# Patient Record
Sex: FEMALE | Race: WHITE
[De-identification: names, ages, dates, MRNs, and addresses within clinical notes are randomized per-mention and may not be internally consistent; named-entity substitution may affect disease eponyms.]

---

## 2017-01-26 ENCOUNTER — HOSPITAL ENCOUNTER (OUTPATIENT)
Age: 57
Discharge: HOME | End: 2017-01-26
Payer: MEDICAID

## 2017-01-26 DIAGNOSIS — E03.9: Primary | ICD-10-CM

## 2017-06-12 NOTE — PREOP HISTORY & PHYSICAL
DATE OF ADMISSION/SURGERY: 06/14/2017

 

IDENTIFICATION: A 56-year-old G6, P3-0-3-3.

 

HISTORY OF PRESENT ILLNESS: The patient presents to [facility] for a scheduled preop
erative visit. She is anticipated to have a hysteroscopy, dilatation and curettage secondary to endom
etrial cells found on her Pap smear. Despite multiple attempts, we could not obtain a satisfactory en
dometrial sample. Only endocervical cells were obtained. I discussed with the patient the risks, bene
fits, alternatives, indications and expectations of a hysteroscopy, dilatation and curettage. Include
d in our discussion were the risk of hemorrhage, infection, and with this particular surgery inadvert
ent uterine perforation. After all of her questions were answered to her satisfaction, she verbalized
 her desire to proceed with surgery. Consent forms have been signed.

 

The patient is feeling well. She denies any nausea, vomiting, fevers, chills, diarrhea or constipatio
n.

 

PAST MEDICAL HISTORY

1. Morbid obesity.

2. Hypertension.

3. Hypothyroidism.

4. Alopecia areata.

 

PAST SURGICAL HISTORY: Exploratory laparotomy with excision of her right fallopian tube. 

 

ALLERGIES: MORPHINE, ITCHING.

 

MEDICATIONS: Tennyson Thyroid 90 mg 2 tablets p.o. daily via [facility]. 

 

SOCIAL HISTORY: She denies any tobacco, alcohol or illicit drug use.

 

PAST OBSTETRICAL HISTORY: Three term spontaneous vaginal deliveries and 3 miscarriages.

 

PAST GYNECOLOGICAL HISTORY: She has had a history of ASCUS Pap smears, but her most recent Pap smear,
 with the exception of endometrial cells seen on the specimen, was negative and negative for high-ris
k human papillomavirus as well. Her 07/05/2015 Pap smear and high-risk human papilloma virus is negat
lady.

 

REVIEW OF SYSTEMS: Negative unless otherwise stated.

 

OBJECTIVE

VITAL SIGNS: Weight is 309 pounds, blood pressure 122/80.

GENERAL: The patient is a well-developed, well-nourished  female who is alert and anthony
ented x3. She is a very pleasant and intelligent lady.

HEENT: Within normal limits. She does wear glasses.

CARDIOVASCULAR: Rate is regular, no murmurs or rubs.

PULMONARY: Lungs clear to auscultation bilaterally.

ABDOMEN: Obese but nontender. No masses, rigidity, or bounding.

 

ASSESSMENT

1. A 56-year-old G6, P3-0-3-3.

2. Endometrial cells seen on Pap smear with failed outpatient endometrial biopsy attempt.

 

PLAN

1. We will proceed to a scheduled hysteroscopy, dilatation and curettage on 06/14/2017.

2. Prescription for Ativan for anxiety prior to surgery as well as for Vicodin for postoperative pain
 control.

3. She will see me in 2 weeks at [facility] for a routine postop visit.

 

 

 

DD:06/12/2017 11:36:00  DT: 06/12/2017 12:20  JOB #: [id number]  EXT JOB #:[id number]

## 2017-06-14 ENCOUNTER — HOSPITAL ENCOUNTER (INPATIENT)
Dept: HOSPITAL 76 - SDS | Age: 57
LOS: 3 days | Discharge: HOME | DRG: 981 | End: 2017-06-17
Attending: OBSTETRICS & GYNECOLOGY | Admitting: OBSTETRICS & GYNECOLOGY
Payer: MEDICAID

## 2017-06-14 DIAGNOSIS — D25.0: ICD-10-CM

## 2017-06-14 DIAGNOSIS — K56.7: ICD-10-CM

## 2017-06-14 DIAGNOSIS — K66.1: ICD-10-CM

## 2017-06-14 DIAGNOSIS — I10: ICD-10-CM

## 2017-06-14 DIAGNOSIS — Z90.721: ICD-10-CM

## 2017-06-14 DIAGNOSIS — D25.2: ICD-10-CM

## 2017-06-14 DIAGNOSIS — G89.18: ICD-10-CM

## 2017-06-14 DIAGNOSIS — N99.71: Primary | ICD-10-CM

## 2017-06-14 DIAGNOSIS — E66.01: ICD-10-CM

## 2017-06-14 DIAGNOSIS — E05.90: ICD-10-CM

## 2017-06-14 DIAGNOSIS — Z91.14: ICD-10-CM

## 2017-06-14 DIAGNOSIS — E03.9: ICD-10-CM

## 2017-06-14 DIAGNOSIS — Z78.0: ICD-10-CM
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
## 2017-06-14 LAB
BASOPHILS NFR BLD AUTO: 0 10^3/UL (ref 0–0.1)
BASOPHILS NFR BLD AUTO: 0 10^3/UL (ref 0–0.1)
BASOPHILS NFR BLD AUTO: 0.2 %
BASOPHILS NFR BLD AUTO: 0.4 %
EOSINOPHIL # BLD AUTO: 0 10^3/UL (ref 0–0.7)
EOSINOPHIL # BLD AUTO: 0.2 10^3/UL (ref 0–0.7)
EOSINOPHIL NFR BLD AUTO: 0.1 %
EOSINOPHIL NFR BLD AUTO: 4.2 %
ERYTHROCYTE [DISTWIDTH] IN BLOOD BY AUTOMATED COUNT: 13.6 % (ref 12–15)
ERYTHROCYTE [DISTWIDTH] IN BLOOD BY AUTOMATED COUNT: 14 % (ref 12–15)
HCT VFR BLD AUTO: 28.3 % (ref 37–47)
HCT VFR BLD AUTO: 33.3 % (ref 37–47)
HGB UR QL STRIP: 11.2 G/DL (ref 12–16)
HGB UR QL STRIP: 9.6 G/DL (ref 12–16)
LYMPHOCYTES # SPEC AUTO: 0.7 10^3/UL (ref 1.5–3.5)
LYMPHOCYTES # SPEC AUTO: 1.6 10^3/UL (ref 1.5–3.5)
LYMPHOCYTES NFR BLD AUTO: 31.5 %
LYMPHOCYTES NFR BLD AUTO: 6.7 %
MCH RBC QN AUTO: 29 PG (ref 27–31)
MCH RBC QN AUTO: 29.2 PG (ref 27–31)
MCHC RBC AUTO-ENTMCNC: 33.5 G/DL (ref 32–36)
MCHC RBC AUTO-ENTMCNC: 33.8 G/DL (ref 32–36)
MCV RBC AUTO: 86.5 FL (ref 81–99)
MCV RBC AUTO: 86.7 FL (ref 81–99)
MONOCYTES # BLD AUTO: 0.2 10^3/UL (ref 0–1)
MONOCYTES # BLD AUTO: 0.4 10^3/UL (ref 0–1)
MONOCYTES NFR BLD AUTO: 4.4 %
MONOCYTES NFR BLD AUTO: 4.5 %
NEUTROPHILS # BLD AUTO: 3 10^3/UL (ref 1.5–6.6)
NEUTROPHILS # BLD AUTO: 8.8 10^3/UL (ref 1.5–6.6)
NEUTROPHILS # SNV AUTO: 10 X10^3/UL (ref 4.8–10.8)
NEUTROPHILS # SNV AUTO: 5 X10^3/UL (ref 4.8–10.8)
NEUTROPHILS NFR BLD AUTO: 59.5 %
NEUTROPHILS NFR BLD AUTO: 88.5 %
NRBC # BLD AUTO: 0 /100WBC
NRBC # BLD AUTO: 0 /100WBC
PDW BLD AUTO: 8.9 FL (ref 7.9–10.8)
PDW BLD AUTO: 9.3 FL (ref 7.9–10.8)
RBC MAR: 3.27 10^6/UL (ref 4.2–5.4)
RBC MAR: 3.84 10^6/UL (ref 4.2–5.4)
WBC # BLD: 10 X10^3/UL
WBC # BLD: 5 X10^3/UL

## 2017-06-14 PROCEDURE — 84439 ASSAY OF FREE THYROXINE: CPT

## 2017-06-14 PROCEDURE — 88307 TISSUE EXAM BY PATHOLOGIST: CPT

## 2017-06-14 PROCEDURE — 86900 BLOOD TYPING SEROLOGIC ABO: CPT

## 2017-06-14 PROCEDURE — 0UDB8ZZ EXTRACTION OF ENDOMETRIUM, VIA NATURAL OR ARTIFICIAL OPENING ENDOSCOPIC: ICD-10-PCS | Performed by: OBSTETRICS & GYNECOLOGY

## 2017-06-14 PROCEDURE — 36415 COLL VENOUS BLD VENIPUNCTURE: CPT

## 2017-06-14 PROCEDURE — 88305 TISSUE EXAM BY PATHOLOGIST: CPT

## 2017-06-14 PROCEDURE — 0UT74ZZ RESECTION OF BILATERAL FALLOPIAN TUBES, PERCUTANEOUS ENDOSCOPIC APPROACH: ICD-10-PCS | Performed by: OBSTETRICS & GYNECOLOGY

## 2017-06-14 PROCEDURE — 86850 RBC ANTIBODY SCREEN: CPT

## 2017-06-14 PROCEDURE — 74020: CPT

## 2017-06-14 PROCEDURE — 0UT9FZZ RESECTION OF UTERUS, VIA NATURAL OR ARTIFICIAL OPENING WITH PERCUTANEOUS ENDOSCOPIC ASSISTANCE: ICD-10-PCS | Performed by: OBSTETRICS & GYNECOLOGY

## 2017-06-14 PROCEDURE — 86920 COMPATIBILITY TEST SPIN: CPT

## 2017-06-14 PROCEDURE — 0UT04ZZ RESECTION OF RIGHT OVARY, PERCUTANEOUS ENDOSCOPIC APPROACH: ICD-10-PCS | Performed by: OBSTETRICS & GYNECOLOGY

## 2017-06-14 PROCEDURE — 84443 ASSAY THYROID STIM HORMONE: CPT

## 2017-06-14 PROCEDURE — 85025 COMPLETE CBC W/AUTO DIFF WBC: CPT

## 2017-06-14 PROCEDURE — 86901 BLOOD TYPING SEROLOGIC RH(D): CPT

## 2017-06-14 PROCEDURE — 84481 FREE ASSAY (FT-3): CPT

## 2017-06-14 RX ADMIN — DOCUSATE SODIUM SCH MG: 100 CAPSULE, LIQUID FILLED ORAL at 21:45

## 2017-06-14 RX ADMIN — ACETAMINOPHEN SCH: 500 TABLET ORAL at 18:33

## 2017-06-14 RX ADMIN — CELECOXIB SCH MG: 100 CAPSULE ORAL at 21:45

## 2017-06-14 RX ADMIN — SODIUM CHLORIDE, POTASSIUM CHLORIDE, SODIUM LACTATE AND CALCIUM CHLORIDE SCH MLS/HR: 600; 310; 30; 20 INJECTION, SOLUTION INTRAVENOUS at 20:26

## 2017-06-14 NOTE — OPERATIVE REPORT
DATE OF SURGERY:  06/14/2017 00:00:00

 

PREOPERATIVE DIAGNOSIS: 

1. A 56-year-old G6, P3-0-3-3.

2. Endometrial cells on Pap smear. 

 

POSTOPERATIVE DIAGNOSIS: 

1. A 56-year-old G6, P3-0-3-3.

2. Endometrial cells on Pap smear.

3. Multiple leiomyomata.

4. Uterine perforation. 

 

NAME OF PROCEDURE: 

1. Hysteroscopy.

2. Dilatation and curettage.

3. Endometrial curettings.

4. Total laparoscopic hysterectomy.

5. Bilateral salpingectomy with right oophorectomy.

6. Cystoscopy.

7. Removal of needle from the anterior abdominal wall.

8. Transfusion of 1 unit of packed red blood cells. 

 

SURGEON:  Ciera Ambrose DO

 

ASSISTANT:  Orestes Lua MD, for the total laparoscopic hysterectomy portion of 
the procedure and for the completion of the case.

 

ANESTHESIA: 

1. LMA for the hysteroscopy.

2. General endotracheal tube portion for the laparoscopic portion. 

 

ANESTHETIST 

1. Brennon Jang CRNA 

2. Godfrey Power CRNA 

 

FINDINGS 

1. Large submucosal fibroid.

2. Right posterior myometrial perforation.

3. Multiple leiomyomata, including pedunculated and subserosal.

4. Surgically absent left ovary.

5. Normal appendix and liver edge.

6. Intact bladder and bilateral ureteral jets visualized. 

7. V-Loc suture in the left lower quadrant laparoscopic port site. 

 

ESTIMATED BLOOD LOSS: 1000 mL.

 

DRAINS: Irwin catheter to gravity.

 

COMPLICATIONS: Uterine perforation in the right posterior myometrium.

 

BRIEF HISTORY: This is a patient of [facility] with whom I 
have been seeing. Unfortunately, the patient has had a Pap smear showing 
endometrial cells. After 2 attempts of endometrial biopsy, I was unsuccessful 
in obtaining any endometrial cells. Only endocervical cells were found on 
pathology. I recommended to the patient that we proceed to a hysteroscopy, 
dilatation and curettage in order to a) delineate the etiology of the 
endometrial cells and b) hopefully stop any further endometrial cells on her 
future Pap smears. I discussed with the patient the risks, benefits, 
alternatives, indications, expectations of hysteroscopy, dilatation and 
curettage. Included in the risks were infection, hemorrhage, and particularly 
in this case, uterine perforation. After the patient's questions were answered 
to her satisfaction, she verbalized her desire to proceed with surgery. Consent 
forms were signed.

 

OPERATION IN DETAIL: The patient was identified, consented, and taken to the 
operating room where IV access was already in place. She was then given 
sequential compression devices, which were placed on her lower extremities and 
turned on. She was then given satisfactory LMA anesthesia per Brennon Jang CRNA. The patient was then placed in the lithotomy position using the Yellofin 
stirrups. A timeout was performed, which correctly identified the patient, site 
of the procedure, and the procedures themselves.

 

The single-toothed tenaculum was placed on the anterior lip of the cervix, and 
the cervix was then serially dilated to an 8-Sami. A diagnostic hysteroscope 
with 5 mm diameter was then placed into the endometrium. A 30-degree camera was 
used. There was an area of adhesions just to the level of approximately the 
internal cervical os. A curettage was then performed, which lysed the adhesion. 
Repeat hysteroscopy revealed a very large submucosal fibroid encompassing the 
entire endometrium. Normal saline solution was used at this portion of the 
procedure. I then proceeded to perform an endometrial curetting in order to 
hopefully excise the fibroid. Unfortunately, due to its size, I was unable to 
get to its originating stalk. 



I next proceeded to an operative hysteroscopy and used a banded electrocautery. 
At this time, sorbitol was then used for surgery. It appeared that the stalk of 
the leiomyomata originated approximately at the inferior posterior endometrium. 
Electrocautery using the band was used in order to remove the originating 
stalk. During the extraction of the fibroid, a piece of the tissue was noted, 
appeared to be fat. Indeed, after repeat hysteroscopy was performed, there 
appeared to be a piece of the omentum originally in the right posterior portion 
of the uterine wall. Given the presumptive diagnosis of a uterine perforation, 
I then decided to cease the hysteroscopic portion of the case and then proceed 
to a diagnostic laparoscopy. This clinically made sense since it was so 
difficult to obtain satisfactory visualization during the hysteroscopy portion 
of the case. All instruments were removed out of the vagina and an acorn 
uterine manipulator was then placed in the cervix. 



It was at this point in time during the procedure that I elected to ask my 
colleague, Dr. Orestes Lua to help me, given that the patient had a very high 
BMI and that in the face of the uterine perforation, hysterectomy may be a 
procedure that would be performed in order to obtain hemostasis if necessary. 
Dr. Lua was kind enough to come to the operating room.

 

The patient was then redraped and prepped. A Irwin catheter was also placed in 
her bladder. The patient was changed to GET from her LMA per the CRNAs. Three 
laparoscopic port sites were then identified in the abdomen. All 3 port sites 
were 5 mm in length with the first one in the subumbilical fold, the second and 
third ones in the right and left lower quadrants. All port sites were injected 
with a total of 10 mL of 0.25% Marcaine with epinephrine. All laparoscopic port 
sites were made 5 mm in length. Using the Visiport trocar, the subumbilical 
fold incision was first used in order to obtain entrance into the abdomen. 
After visual confirmation of entrance in the abdomen was made, CO2 gas was then 
used to insufflate the abdomen. A satisfactory entrance in the abdomen was 
visually reconfirmed. The 2 right and left lower quadrant port sites were then 
placed after identifying the respective anterior superior iliac spines and then 
moving 2 fingerbreadths superior and medial to those sites. These 2 port sites 
were placed under direct visualization of the camera. No trauma to 
intraabdominal organs was noted. Throughout the case, it was difficult to keep 
the trocar sleeves in place. We eventually had to convert to the elongated 5 mm 
trocar sleeves, given the thickness of the anterior abdominal wall.

 

Inspection of the pelvis revealed that there was hemoperitoneum. This was 
watered down, consistent with the distending media that was used to perform the 
hysteroscopy. Indeed, the area of active bleeding in the uterus was the right 
posterior myometrium. Also noted were multiple leiomyomata, both pedunculated 
and subserosal. The patient was noted to have a normal appendix and liver edge. 
The site of the uterine perforation was noted to be actively bleeding. Given 
the size and number of the fibroids, and that the patient was postmenopausal, I 
felt it was in her best interest that we proceed to controlling the bleeding 
via a total laparoscopic hysterectomy and bilateral salpingo-oophorectomy. The 
patient did not have any family members present in the waiting room, so I could 
not confer with them at this point in time.

 

The acorn uterine manipulator was then removed out of the uterus, and a large 
VCare uterine manipulator and colpotomizer was placed in the uterus. Closer 
inspection of the adnexa revealed that there were some adhesions of the right 
adnexa. We did not appreciate a radha left ovary. This was contradictory to her 
past surgical history in which she had an exploratory laparotomy with a 
reported excision of her right fallopian tube. There was only a left fallopian 
tube noted on the left adnexa and no left ovary. 



Ancef 3 gm IV was given for post-operative cellulitis prophylaxis. Given the 
adhesions on the left adnexa, I decided to proceed to removing the right adnexa 
first. The right adnexa was within normal limits with the fallopian tube and 
ovary. The right adnexa was identified and followed out to its fimbriated end. 
An incision was made in the right infundibulopelvic ligament. This was then 
clamped, cauterized, and incised using the LigaSure. The incision was then 
carried through inferiorly in order to release the round and broad ligaments on 
the right aspect of the uterus. This incision was then carried down inferiorly 
in the peritoneum in order to create a bladder flap. Incision was then brought 
in medially in order to start the bladder flap. 



Inspection of the left aspect of the adnexa was then performed. The left ureter 
was visualized, and it was noted to vermiculate. I was unsure of the course of 
the left ureter and decided to begin the excision of the uterus by incising the 
fallopian tube at its insertion tube. The left fallopian tube was then clamped, 
cauterized, and incised with the LigaSure. This incision was then carried 
through to the left aspect of the broad ligament, as well as the round 
ligament. This incision was extended inferiorly through the peritoneum and then 
brought in medially in order to join the incision made into the peritoneum, 
already made on the right aspect of the uterus. The left uterine artery was 
then clamped and cauterized. It was not ligated at this point in time. The 
bladder flap was then further developed by dissecting the anterior 
vesicouterine peritoneum both with electrocautery as well as blunt dissection. 
The right lower aspect of the uterus was then further dissected off by clamping
, cauterizing, and incising the right uterine artery. At this point in time, 
the left uterine artery was then again clamped, cauterized, and incised. The 
uterus began to show signs of blanching.

 

[facility] uterine manipulator was then palpated through the anterior vagina. 
With the harmonic blade, an incision was made through the anterior vagina at 
the very superior portion. We were able to visualize the green cup of the 
VCare. An incision was made then circumferentially on top of the green cup of 
the VCare in order to avoid perforation into the bladder. The uterus was then 
finally excised from the pelvis. The uterus was then removed from the abdomen 
through the vagina. The vaginal cuff was then closed with a running V-Loc 90 4-
0 suture laparoscopically. The vaginal cuff was found to be hemostatically 
stable, as well as the rest of the pelvis. A second dose of ancef 3 gm IV was 
given at the 3 hour luis.

 

After the vaginal cuff had been sewn, the needle was noticeably absent as I was 
attempting to remove it from the abdomen. We explored the abdomen 
laparoscopically and was unable to visualize the V-Loc needle. Radiology was 
then called in, and 1 flat plate x-ray of the abdomen was performed. The needle 
was found to be in the left aspect of the abdomen at the level of the ileum. 
Two other lateral x-rays were performed in order to identify the level in which 
the needle was. It appeared to be at the level of the abdominal wall. The left 
lower quadrant laparoscopic port site was then extended to approximately 1 cm 
in length. The incision was then digitally explored and the needle along with 
the remaining suture was then brought through, out of the abdominal wall.



Cystoscopy was then performed, and visualization of both ureters were noted. 
Methylene blue was then injected to the patient, and both ureteral jets were 
present. Also, no perforation or cautery marks were seen inside the bladder. 
The irwin catheter was replaced.

 

At this point in time, the procedure had been completed. The CO2 gas was 
allowed to egress into the atmosphere, thus relieving the pneumoperitoneum. The 
left lower quadrant site was reapproximated with a single interrupted stitch of 
0 Vicryl using a Darren-Mildred closure. All 3 port sites were closed with 4-0 
Monocryl in a subcuticular fashion. Dermabond was placed on top of all 3 
incisions.

 

The patient tolerated the procedure well and was taken back to recovery room in 
stable condition. Given her large estimated blood loss, she was given 1 unit of 
packed red blood cells intraoperatively. This was a very difficult case, given 
the patient's body habitus and fibroids. I will keep the patient overnight for 
monitoring and will potentially need to transfuse more blood should her 
hemoglobin drop below 7 or if she is symptomatic in the hemoglobin range of 7 mg
/dL. All sponge, lap and needle counts were correct x2 as per nurse report.

 

 

 

DD:06/14/2017 15:55:00  DT: 06/14/2017 18:35  JOB #: [id number]  EXT JOB #:[id number]

[email or web address]

## 2017-06-14 NOTE — XRAY REPORT
TWO VIEW ABDOMEN: 06/14/2017

 

CLINICAL INDICATION: Intraoperative films. 

 

FINDINGS:  Frontal and cross-table lateral portable intraoperative views of the 
abdomen demonstrate a needle in the left lower quadrant. Subcutaneous emphysema 
is present.

 

IMPRESSION:  NEEDLE IN THE LEFT LOWER QUADRANT OF THE ABDOMEN. 

 

CRITICAL RESULT: RESULTS CALLED TO DR. LANDA ON 06/14/2017 AT 1505 HOURS.

 

 

 

DD:06/14/2017 15:07:02  DT: 06/14/2017 16:35  JOB #: [id number]  EXT JOB #:
[id number]

[facility]

## 2017-06-14 NOTE — OPERATIVE REPORT
Operative Report





- Other


Other Information/Narrative: 





Date of Operation: 2017


Surgeon: Ciera Ambrose DO FACOG


Assistant: Orestes Lua MD FACOG (for the total laparoscopic hysterectomy and 

bilateral salpingectomy)


Anesthetist: Brennon Jang CRNA


Anesthesia: 1. LMA for hysteroscopy


   2. GET for laparoscopy


Pre-op Dx: 1. 57 yo 


   2. Endometrial cells on pap smear


Post-op Dx: 1. 57 yo 


   2. Endometrial cells on pap smear


   3. Multiple leiomyoma


   4. Uterine perforation


Procedures: 1. Hysteroscopy


   2. Dilation and curettage


   3. Endometrial curettings


   4. Total laparoscopic hysterectomy


   5. Bilateral salpingectomy-oophorectomy


   6. Cystoscopy


   7. Removal of needle in abdominal wall


   8. Transfusion of 1 unit of PRBC


Findings: 1. Large submucosal fibroid


   2. Right posterior uterine perforation


   3. Multiple leiomyomas including pedunculated and subserosal


   4. Normal appendix and liver edge


   5. Intact bladder with bilateral ureteral jets visualized


   6. V-loc suture left lower quadrant laparoscopic port site 


EBL: 1000 mL


Drains: Urbina catheter to gravity


Complications: Uterine perforation of right posterior myometrium

## 2017-06-15 LAB
BASOPHILS NFR BLD AUTO: 0 10^3/UL (ref 0–0.1)
BASOPHILS NFR BLD AUTO: 0.3 %
EOSINOPHIL # BLD AUTO: 0 10^3/UL (ref 0–0.7)
EOSINOPHIL NFR BLD AUTO: 0.5 %
ERYTHROCYTE [DISTWIDTH] IN BLOOD BY AUTOMATED COUNT: 14.1 % (ref 12–15)
HCT VFR BLD AUTO: 27.3 % (ref 37–47)
HGB UR QL STRIP: 9.2 G/DL (ref 12–16)
LYMPHOCYTES # SPEC AUTO: 1.4 10^3/UL (ref 1.5–3.5)
LYMPHOCYTES NFR BLD AUTO: 22 %
MCH RBC QN AUTO: 29.2 PG (ref 27–31)
MCHC RBC AUTO-ENTMCNC: 33.8 G/DL (ref 32–36)
MCV RBC AUTO: 86.3 FL (ref 81–99)
MONOCYTES # BLD AUTO: 0.5 10^3/UL (ref 0–1)
MONOCYTES NFR BLD AUTO: 8.1 %
NEUTROPHILS # BLD AUTO: 4.5 10^3/UL (ref 1.5–6.6)
NEUTROPHILS # SNV AUTO: 6.6 X10^3/UL (ref 4.8–10.8)
NEUTROPHILS NFR BLD AUTO: 69.1 %
NRBC # BLD AUTO: 0 /100WBC
PDW BLD AUTO: 8.7 FL (ref 7.9–10.8)
RBC MAR: 3.16 10^6/UL (ref 4.2–5.4)
TSH SERPL-ACNC: 0.25 UIU/ML (ref 0.34–5.6)
WBC # BLD: 6.6 X10^3/UL

## 2017-06-15 RX ADMIN — DOCUSATE SODIUM SCH MG: 100 CAPSULE, LIQUID FILLED ORAL at 08:20

## 2017-06-15 RX ADMIN — CELECOXIB SCH MG: 100 CAPSULE ORAL at 20:06

## 2017-06-15 RX ADMIN — CELECOXIB SCH MG: 100 CAPSULE ORAL at 08:20

## 2017-06-15 RX ADMIN — SODIUM CHLORIDE, POTASSIUM CHLORIDE, SODIUM LACTATE AND CALCIUM CHLORIDE SCH MLS/HR: 600; 310; 30; 20 INJECTION, SOLUTION INTRAVENOUS at 02:00

## 2017-06-15 RX ADMIN — ACETAMINOPHEN SCH MG: 500 TABLET ORAL at 00:00

## 2017-06-15 RX ADMIN — ACETAMINOPHEN SCH MG: 500 TABLET ORAL at 07:05

## 2017-06-15 RX ADMIN — SODIUM CHLORIDE, POTASSIUM CHLORIDE, SODIUM LACTATE AND CALCIUM CHLORIDE SCH: 600; 310; 30; 20 INJECTION, SOLUTION INTRAVENOUS at 11:34

## 2017-06-15 RX ADMIN — SODIUM CHLORIDE, POTASSIUM CHLORIDE, SODIUM LACTATE AND CALCIUM CHLORIDE SCH: 600; 310; 30; 20 INJECTION, SOLUTION INTRAVENOUS at 17:17

## 2017-06-15 RX ADMIN — ACETAMINOPHEN SCH MG: 500 TABLET ORAL at 16:08

## 2017-06-15 RX ADMIN — DOCUSATE SODIUM SCH MG: 100 CAPSULE, LIQUID FILLED ORAL at 20:06

## 2017-06-15 NOTE — PROVIDER PROGRESS NOTE
Subjective





- Prog Note Date


Prog Note Date: 06/15/17


Prog Note Time: 11:03





Objective





- Vital Signs/Intake & Output


Vital Signs: 





 Vital Signs x48h











  Temp Pulse Resp BP Pulse Ox


 


 06/15/17 08:38  98.4 F  66  18  134/66 H  96


 


 06/15/17 05:04  98.6 F  83  20  110/65  95











Intake & Output: 





 Intake & Output











 06/12/17 06/13/17 06/14/17 06/15/17





 23:59 23:59 23:59 23:59


 


Intake Total   5042 1447


 


Output Total   4500 800


 


Balance   542 647














- Lab Results


Fish Bones: 


 06/15/17 07:03





Other Labs: 





 Lab Results x24hrs











  06/15/17 06/15/17 06/15/17 Range/Units





  07:03 07:03 07:03 


 


WBC    6.6  (4.8-10.8)  x10^3/uL


 


RBC    3.16 L  (4.20-5.40)  10^6/uL


 


Hgb    9.2 L  (12.0-16.0)  g/dL


 


Hct    27.3 L  (37.0-47.0)  %


 


MCV    86.3  (81.0-99.0)  fL


 


MCH    29.2  (27.0-31.0)  pg


 


MCHC    33.8  (32.0-36.0)  g/dL


 


RDW    14.1  (12.0-15.0)  %


 


Plt Count    150  (130-450)  10^3/uL


 


MPV    8.7  (7.9-10.8)  fL


 


Neut #    4.5  (1.5-6.6)  10^3/uL


 


Lymph #    1.4 L  (1.5-3.5)  10^3/uL


 


Mono #    0.5  (0.0-1.0)  10^3/uL


 


Eos #    0.0  (0.0-0.7)  10^3/uL


 


Baso #    0.0  (0.0-0.1)  10^3/uL


 


Absolute Nucleated RBC    0.00  x10^3/uL


 


Nucleated RBCs    0.0  /100WBC


 


TSH   0.25 L   (0.34-5.60)  uIU/mL


 


Free T4   0.97   (0.58-1.64)  ng/dL


 


Free T3 pg/mL  2.86    (2.5-3.9)  pg/mL


 


Blood Type     


 


Blood Type Recheck     


 


Antibody Screen     


 


Crossmatch IS Only     














  06/14/17 06/14/17 06/14/17 Range/Units





  19:25 11:55 11:00 


 


WBC  10.0   5.0  (4.8-10.8)  x10^3/uL


 


RBC  3.84 L   3.27 L  (4.20-5.40)  10^6/uL


 


Hgb  11.2 L   9.6 L  (12.0-16.0)  g/dL


 


Hct  33.3 L   28.3 L  (37.0-47.0)  %


 


MCV  86.7   86.5  (81.0-99.0)  fL


 


MCH  29.0   29.2  (27.0-31.0)  pg


 


MCHC  33.5   33.8  (32.0-36.0)  g/dL


 


RDW  14.0   13.6  (12.0-15.0)  %


 


Plt Count  182   154  (130-450)  10^3/uL


 


MPV  9.3   8.9  (7.9-10.8)  fL


 


Neut #  8.8 H   3.0  (1.5-6.6)  10^3/uL


 


Lymph #  0.7 L   1.6  (1.5-3.5)  10^3/uL


 


Mono #  0.4   0.2  (0.0-1.0)  10^3/uL


 


Eos #  0.0   0.2  (0.0-0.7)  10^3/uL


 


Baso #  0.0   0.0  (0.0-0.1)  10^3/uL


 


Absolute Nucleated RBC  0.00   0.00  x10^3/uL


 


Nucleated RBCs  0.0   0.0  /100WBC


 


TSH     (0.34-5.60)  uIU/mL


 


Free T4     (0.58-1.64)  ng/dL


 


Free T3 pg/mL     (2.5-3.9)  pg/mL


 


Blood Type     


 


Blood Type Recheck   A POSITIVE   


 


Antibody Screen     


 


Crossmatch IS Only     














  06/14/17 Range/Units





  11:00 


 


WBC   (4.8-10.8)  x10^3/uL


 


RBC   (4.20-5.40)  10^6/uL


 


Hgb   (12.0-16.0)  g/dL


 


Hct   (37.0-47.0)  %


 


MCV   (81.0-99.0)  fL


 


MCH   (27.0-31.0)  pg


 


MCHC   (32.0-36.0)  g/dL


 


RDW   (12.0-15.0)  %


 


Plt Count   (130-450)  10^3/uL


 


MPV   (7.9-10.8)  fL


 


Neut #   (1.5-6.6)  10^3/uL


 


Lymph #   (1.5-3.5)  10^3/uL


 


Mono #   (0.0-1.0)  10^3/uL


 


Eos #   (0.0-0.7)  10^3/uL


 


Baso #   (0.0-0.1)  10^3/uL


 


Absolute Nucleated RBC   x10^3/uL


 


Nucleated RBCs   /100WBC


 


TSH   (0.34-5.60)  uIU/mL


 


Free T4   (0.58-1.64)  ng/dL


 


Free T3 pg/mL   (2.5-3.9)  pg/mL


 


Blood Type  A POSITIVE  


 


Blood Type Recheck   


 


Antibody Screen  NEGATIVE  


 


Crossmatch IS Only  See Detail  














Assessment/Plan





- Problem List


(1) Hemorrhage in uterus


Impression: 


Pharmacist has called me regarding patient's thyroid medications. After calling 

patient's pharmacy, discovered that the patient has not been filling Rx for 

thyroid meds consistently. Therefore, patient not taking meds as Rx. Has been 

on [place] Thyroid 90 mg 2 tabs po daily. 





TFT rechecked today and low TSH. Will hold armour thyroid for now. Patient will 

need to see her PCP to discuss her thyroid status and how different thyroid 

medications work.

## 2017-06-15 NOTE — PROVIDER PROGRESS NOTE
Subjective





- Prog Note Date


Prog Note Date: 06/15/17


Prog Note Time: 08:19





- Subjective


Pt reports feeling: Improved


Subjective: 





Patient sitting in bed eating breakfast. No nausea or vomiting. Feeling very 

sore. Surprised that she had a hysterectomy yesterday. Has ambulated. 





Objective





- Vital Signs/Intake & Output


Reviewed Vital Signs: Yes


Vital Signs: 





 Vital Signs x48h











  Temp Pulse Resp BP Pulse Ox


 


 06/15/17 05:04  98.6 F  83  20  110/65  95











Intake & Output: 





 Intake & Output











 06/12/17 06/13/17 06/14/17 06/15/17





 23:59 23:59 23:59 23:59


 


Intake Total   5042 1327


 


Output Total   4500 450


 


Balance   542 877














- Objective


General Appearance: positive: No acute distress (Looks tired)


Neurologic/Psychiatric: positive: Oriented x3





- Lab Results


Fish Bones: 


 06/15/17 07:03





Other Labs: 





 Lab Results x24hrs











  06/15/17 06/14/17 06/14/17 Range/Units





  07:03 19:25 11:55 


 


WBC  6.6  10.0   (4.8-10.8)  x10^3/uL


 


RBC  3.16 L  3.84 L   (4.20-5.40)  10^6/uL


 


Hgb  9.2 L  11.2 L   (12.0-16.0)  g/dL


 


Hct  27.3 L  33.3 L   (37.0-47.0)  %


 


MCV  86.3  86.7   (81.0-99.0)  fL


 


MCH  29.2  29.0   (27.0-31.0)  pg


 


MCHC  33.8  33.5   (32.0-36.0)  g/dL


 


RDW  14.1  14.0   (12.0-15.0)  %


 


Plt Count  150  182   (130-450)  10^3/uL


 


MPV  8.7  9.3   (7.9-10.8)  fL


 


Neut #  4.5  8.8 H   (1.5-6.6)  10^3/uL


 


Lymph #  1.4 L  0.7 L   (1.5-3.5)  10^3/uL


 


Mono #  0.5  0.4   (0.0-1.0)  10^3/uL


 


Eos #  0.0  0.0   (0.0-0.7)  10^3/uL


 


Baso #  0.0  0.0   (0.0-0.1)  10^3/uL


 


Absolute Nucleated RBC  0.00  0.00   x10^3/uL


 


Nucleated RBCs  0.0  0.0   /100WBC


 


Blood Type     


 


Blood Type Recheck    A POSITIVE  


 


Antibody Screen     


 


Crossmatch IS Only     














  17 Range/Units





  11:00 11:00 


 


WBC  5.0   (4.8-10.8)  x10^3/uL


 


RBC  3.27 L   (4.20-5.40)  10^6/uL


 


Hgb  9.6 L   (12.0-16.0)  g/dL


 


Hct  28.3 L   (37.0-47.0)  %


 


MCV  86.5   (81.0-99.0)  fL


 


MCH  29.2   (27.0-31.0)  pg


 


MCHC  33.8   (32.0-36.0)  g/dL


 


RDW  13.6   (12.0-15.0)  %


 


Plt Count  154   (130-450)  10^3/uL


 


MPV  8.9   (7.9-10.8)  fL


 


Neut #  3.0   (1.5-6.6)  10^3/uL


 


Lymph #  1.6   (1.5-3.5)  10^3/uL


 


Mono #  0.2   (0.0-1.0)  10^3/uL


 


Eos #  0.2   (0.0-0.7)  10^3/uL


 


Baso #  0.0   (0.0-0.1)  10^3/uL


 


Absolute Nucleated RBC  0.00   x10^3/uL


 


Nucleated RBCs  0.0   /100WBC


 


Blood Type   A POSITIVE  


 


Blood Type Recheck    


 


Antibody Screen   NEGATIVE  


 


Crossmatch IS Only   See Detail  














Assessment/Plan





- Problem List


(1) Hemorrhage in uterus


Impression: 


55 yo  S/p hysteroscopy, dilation and curettage, total laparoscopic 

hysterectomy, bilateral salpingectomy, right oophorectomy and cystoscopy


S/p transfusion of 1 unit of PRBC-- Hgb stable





Explained in detail (provided intraoperative photos to the patient) what 

occurred yesterday. After dilation and hysteroscopy occurred, large submucosal 

leiomyoma noted. Attempt of removal of leiomyoma was done and uterine 

perforation noted. Proceeded to a laparoscopy where active uterine bleeding 

noted. Also fibroid floating in the abdomen seen, likely dislodged from the 

uterine perforation. Explained that due to the excessive bleeding from the 

uterine perforation, had to proceed to a hysterectomy. Was a prolonged surgery. 

She received 1 unit of PRBC intraoperatively. Hgb stable today. Patient 

understood the discussion and all her questions were answered to her 

satisfaction. Not ideal but satisfied with outcome. She notes that she's been 

experiencing a year of pelvic "heaviness."





Will remove irwin catheter and saline lock IV. Patient to ambulate today. 

Forgot to add on narcotic; will start on po dilaudid. Anticipate home tomorrow.

## 2017-06-16 RX ADMIN — CELECOXIB SCH MG: 100 CAPSULE ORAL at 08:59

## 2017-06-16 RX ADMIN — ACETAMINOPHEN SCH MG: 500 TABLET ORAL at 07:34

## 2017-06-16 RX ADMIN — ACETAMINOPHEN SCH MG: 500 TABLET ORAL at 00:19

## 2017-06-16 RX ADMIN — POLYETHYLENE GLYCOL 3350 PRN GM: 17 POWDER, FOR SOLUTION ORAL at 08:58

## 2017-06-16 RX ADMIN — DOCUSATE SODIUM SCH MG: 100 CAPSULE, LIQUID FILLED ORAL at 08:59

## 2017-06-16 NOTE — DISCHARGE SUMMARY
DATE OF ADMISSION: 06/14/2017

 

DATE OF DISCHARGE: 06/17/2017

 

DIAGNOSES ON ADMISSION

1. A 56-year-old G6, P3-0-3-3.

2. Endometrial cells on Pap smear.

 

DIAGNOSES ON DISCHARGE

1. A 56-year-old G6, P3-0-3-3.

2. Status post total laparoscopic hysterectomy, bilateral salpingectomy, right oophorectomy, cystosco
py, hysteroscopy, dilatation and curettage.

3. Status post transfusion of 1 unit of packed red blood cells.

4. Mild hypothyroidism.

5. Normal recovery.

 

BRIEF HISTORY: This is a patient of [facility] [place] with who I have been taken care of. She was 
found to have endometrial cells on her Pap smear. Two attempts of obtaining an endometrial sample wer
e unsuccessful. I recommended to the patient that we proceed to a hysteroscopy, D and C for definitiv
e diagnosis and potential treatment.

 

The patient underwent a hysteroscopy, dilatation and curettage. Intraoperatively, there was a large, 
pedunculated submucosal leiomyoma. During an attempt to remove the fibroid, the uterus was perforated
. The surgery was then converted to a laparoscopy and active bleeding was noted in the right posterio
r myometrium. The patient had other multiple leiomyomata including pedunculated and subserosal. In or
mark to control the bleeding, she underwent a total laparoscopic hysterectomy, bilateral salpingectomy
, and right oophorectomy. Her left ovary was surgically absent. Cystoscopy was performed in the proce
dure and bilateral ureteral flow was seen. Bilateral ureteral jets were seen. It was a particularly p
rolonged case secondary to the technical difficulty. She has a large body habitus, which made the kim
yonis very challenging. Surgery for approximately 6 hours. She did get 2 doses of Ancef 3 grams for po
stoperative cellulitis prophylaxis. Prolonging the case was also complicated by a needle that was los
t in the anterior abdominal wall.

 

Radiology was called in and the needle was identified and later removed. She also had significant ble
eding intraoperatively with estimated blood loss of 1000 mL. She did receive 1 unit of packed red blo
od cells intraoperatively. 

 

Postoperatively, she has had a slow recovery. Due to her pain, it has been challenging in order to ge
t her up and out of bed. She is currently tolerating Tylenol and Celebrex with Dilaudid. She also todd
ears to have some ileus and I believe after we have her have a bowel movement, her pain will be signi
ficantly improved.

 

The patient is anticipated to go home on 06/17/2017, which is postoperative day #3. The patient has b
een given restrictions for no heavy lifting, but can slowly increase her activity as tolerated. She h
as been told not to carry anything heavier than a gallon of milk. The patient is expected to feel moe
y tired for the next 4-6 weeks, but is expected to see me in 2 weeks for a routine incision check. Pr
escriptions for Colace, Mobic, and Tylenol have already been faxed to [facility]. A prescription for Di
laudid is available for her at discharge. I tested the patient's thyroid function and it appears that
 she is mildly hyperthyroid. The pharmacist has called her pharmacy and has discovered that the patie
nt is not consistently taking her [place] Thyroid 90 mg 2 tablets p.o. daily. She is not filling her p
rescriptions as noted. I believe mainly the cause of this due to the cost of [place] Thyroid. She is a
damant in having only [place] Thyroid and not a variation of levothyroxine. In any case, I will have h
er decrease her [place] Thyroid to 90 mg 1 tablet p.o. daily until she can follow up with her primary 
care provider. 

 

The patient also is to call should she have any worsening fevers, chills, or abdominal pain that is o
ut of character. She will be discharged to home with an abdominal binder, which seems to improve her 
pain.

 

 

 

DD:06/16/2017 9:18:00  DT: 06/16/2017 11:58  JOB #: [id number]  EXT JOB #:[id number]

## 2017-06-16 NOTE — PROVIDER PROGRESS NOTE
Subjective





- Prog Note Date


Prog Note Date: 17


Prog Note Time: 09:19





- Subjective


Pt reports feeling: No change


Subjective: 





Patient sitting in bed. >1/2 of her breakfast has been consumed. Patient states 

she still has pain. Passing gas but no BM. Due to pain would like to go home 

tomorrow. Has not ambulated much. Abdominal binder is helpful to her. Trocar 

sites painful. 





Current Medications





- Current Medications


Current Medications: 





[place] thyroid 90 mg 2 tabs po QAM-- held due to decreased TSH. Plan to restart 

tomorrow, but only 1 tablet daily.


Celebrex 200 mg BID


Tylenol 500 mg 2 tabs po Q8 hr


Dilaudid 2 mg Q 4 hr





Objective





- Vital Signs/Intake & Output


Vital Signs: 





 Vital Signs x48h











  Temp Pulse Resp BP Pulse Ox


 


 17 07:36  98.2 F  67  20  138/74 H  95


 


 17 04:10  98.4 F  65  18  115/72  96











Intake & Output: 





 Intake & Output











 06/13/17 06/14/17 06/15/17 06/16/17





 23:59 23:59 23:59 23:59


 


Intake Total  5042 2537 800


 


Output Total  4500 1100 


 


Balance  542 1437 800














- Objective


General Appearance: positive: No acute distress


Abdomen: positive: Other (Appropriate tenderness. Incisions clean, dry and 

intact. All incisions covered with Dermabond.)





- Lab Results


Fish Bones: 


 06/15/17 07:03





Other Labs: 





 Lab Results x24hrs











  06/15/17 06/15/17 06/14/17 Range/Units





  07:03 07:03 11:00 


 


TSH   0.25 L   (0.34-5.60)  uIU/mL


 


Free T4   0.97   (0.58-1.64)  ng/dL


 


Free T3 pg/mL  2.86    (2.5-3.9)  pg/mL


 


Blood Type    A POSITIVE  


 


Antibody Screen    NEGATIVE  


 


Crossmatch IS Only    See Detail  














Assessment/Plan





- Problem List


(1) Hemorrhage in uterus


Impression: 


57 yo  with endometrial cells on pap smear, failed office EMB attempts.


S/p 2017 TLH, BS, RO, cystoscopy, hysteroscopy, dilation and curettage.


S/p transfusion of 1 unit PRBC, hemostatically stable.


Abdominal pain 2ndry to ileus. Will ambulate, consult PT, and increase to 

Miralax, Milk of magnesia and enema.


Continue abdominal binder PRN.


Hypothyroid but labs consistent with mild hyperthyroidism. Not taking 

consistent dose of [place] thyroid 90 mg 2 tabs po QAM. Will hold today's dose 

and restart to 1 tablet po QAM.





Rx's for Mobic, tylenol and colace have been efaxed to [facility] in [place]. 

Hand written Rx for dilaudid in the chart for patient to go home with.


Patient to follow up with myself in 2 weeks for an incision check.


Patient to call for worsening fevers, chillls, or irregular abdominal pain.


Will have the on call physician, Dr. Orestes Leiva, discharge patient to home 

tomorrow, 2017. Discharge summary already dictated.

## 2017-06-17 VITALS — SYSTOLIC BLOOD PRESSURE: 166 MMHG | DIASTOLIC BLOOD PRESSURE: 65 MMHG

## 2017-06-17 RX ADMIN — DOCUSATE SODIUM SCH: 100 CAPSULE, LIQUID FILLED ORAL at 01:29

## 2017-06-17 RX ADMIN — DOCUSATE SODIUM SCH MG: 100 CAPSULE, LIQUID FILLED ORAL at 08:06

## 2017-06-17 RX ADMIN — ACETAMINOPHEN SCH: 500 TABLET ORAL at 01:30

## 2017-06-17 RX ADMIN — CELECOXIB SCH MG: 100 CAPSULE ORAL at 08:06

## 2017-06-17 RX ADMIN — ACETAMINOPHEN SCH MG: 500 TABLET ORAL at 08:06

## 2017-06-17 RX ADMIN — ACETAMINOPHEN SCH: 500 TABLET ORAL at 01:28

## 2017-06-17 RX ADMIN — POLYETHYLENE GLYCOL 3350 PRN GM: 17 POWDER, FOR SOLUTION ORAL at 08:05

## 2017-06-17 RX ADMIN — CELECOXIB SCH: 100 CAPSULE ORAL at 01:29

## 2017-06-17 NOTE — DISCHARGE PLAN
Discharge Plan


Disposition: 01 Home, Self Care


Diet: Regular


Activity Restrictions: Activity as Tolerated


Shower Restrictions: No


Driving Restrictions: No


Weight Bearing: Full Weight


No Smoking: If you smoke, Please STOP!  Call [phone] for help.


Follow-up with: 


Ciera Ambrose DO [Provider Admit Priv/Credential] -

## 2017-06-17 NOTE — PROVIDER PROGRESS NOTE
Subjective





- General


Admit Date: 06/14/17


Procedure Date: 06/14/17


Post Op Days: 3


Procedure Performed: HScope converted to TLH





- Review of Systems


Wound/Incisions: positive: Healing well


General: positive: No symptoms


HEENT: positive: No symptoms


Pulmonary: positive: No symptoms


Cardiovascular: positive: No symptoms


Gastrointestinal: positive: No symptoms, Other (BM yesterday)


Genitourinary: positive: No symptoms


Musculoskeletal: positive: No symptoms


Skin: positive: No symptoms


Psychiatric: positive: No symptoms, Other ([place] [employer])





Objective





- Patient Data


Vital Signs: 





 Vital Signs x48h











  Temp Pulse Resp BP Pulse Ox


 


 06/17/17 08:39  97.9 F  60  16  130/73  97


 


 06/17/17 05:01  97.7 F  61  18  117/70  99











Intake & Output: 





 Intake and Output Totals x24h











 06/15/17 06/16/17 06/17/17





 23:59 23:59 23:59


 


Intake Total 2537 1800 300


 


Output Total 1100  


 


Balance 1437 1800 300














- Lab Results


Lab Results: 


 06/15/17 07:03





Other Lab Results: 





 Lab Results x24hrs











  06/14/17 Range/Units





  11:00 


 


Blood Type  A POSITIVE  


 


Antibody Screen  NEGATIVE  


 


Crossmatch IS Only  See Detail  














- Current Medications


Current Medications: 





 Current Medications











Generic Name Dose Route Start Last Admin





  Trade Name Freq  PRN Reason Stop Dose Admin


 


Acetaminophen  1,000 mg 06/14/17 16:00 06/17/17 08:06





  Tylenol  PO   1,000 mg





  Q8H YENNIFER   Administration


 


Celecoxib  200 mg 06/14/17 21:00 06/17/17 08:06





  Celebrex  PO   200 mg





  BID YENNIFER   Administration


 


Docusate Sodium  100 mg 06/14/17 21:00 06/17/17 08:06





  Colace 100mg Capsule  PO   100 mg





  BID YENNIFER   Administration


 


Hydromorphone HCl  1 mg 06/14/17 15:58 06/15/17 02:32





  Dilaudid Inj  IVP   1 mg





  Q3HR PRN   Administration





  PAIN   


 


Hydromorphone HCl  2 mg 06/16/17 14:00 06/17/17 06:08





  Dilaudid  PO   2 mg





  Q4H YENNIFER   Administration


 


Polyethylene Glycol  17 gm 06/15/17 18:54 06/17/17 08:05





  Miralax  PO   17 gm





  DAILY PRN   Administration





  Bowel Protocol   


 


Thyroid  90 mg 06/17/17 07:00 06/17/17 06:08





  [place] Thyroid  PO   90 mg





  QDAC YENNIFER   Administration














Exam





- Exam


Vital Signs: 





 Vital Signs (72 hours)











  06/14/17 06/14/17 06/14/17





  15:10 15:15 15:20


 


Temperature   


 


Heart Rate [   





Brachial]   


 


Respiratory   





Rate   


 


Blood Pressure   





[Right Brachial   





artery]   


 


Blood Pressure   





[Right Radial   





artery]   


 


O2 Saturation 100 99 100














  06/14/17 06/14/17 06/14/17





  15:25 15:30 15:35


 


Temperature   


 


Heart Rate [   





Brachial]   


 


Respiratory   





Rate   


 


Blood Pressure   





[Right Brachial   





artery]   


 


Blood Pressure   





[Right Radial   





artery]   


 


O2 Saturation 99 99 100














  06/14/17 06/14/17 06/14/17





  15:40 15:45 15:50


 


Temperature   


 


Heart Rate [   





Brachial]   


 


Respiratory   





Rate   


 


Blood Pressure   





[Right Brachial   





artery]   


 


Blood Pressure   





[Right Radial   





artery]   


 


O2 Saturation 98 100 99














  06/14/17 06/14/17 06/14/17





  16:00 16:10 16:15


 


Temperature   98.4 F


 


Heart Rate [   54 L





Brachial]   


 


Respiratory   16





Rate   


 


Blood Pressure   130/76





[Right Brachial   





artery]   


 


Blood Pressure   





[Right Radial   





artery]   


 


O2 Saturation 100 100 98














  06/14/17 06/14/17 06/14/17





  16:20 16:30 16:40


 


Temperature   


 


Heart Rate [   





Brachial]   


 


Respiratory   





Rate   


 


Blood Pressure   





[Right Brachial   





artery]   


 


Blood Pressure   





[Right Radial   





artery]   


 


O2 Saturation 100 100 100














  06/14/17 06/14/17 06/14/17





  16:45 16:50 17:00


 


Temperature 97.7 F  


 


Heart Rate [ 59 L  





Brachial]   


 


Respiratory 20  





Rate   


 


Blood Pressure   





[Right Brachial   





artery]   


 


Blood Pressure 131/77 H  





[Right Radial   





artery]   


 


O2 Saturation 98 100 100














  06/14/17 06/14/17 06/14/17





  17:10 17:20 17:30


 


Temperature   


 


Heart Rate [   





Brachial]   


 


Respiratory   





Rate   


 


Blood Pressure   





[Right Brachial   





artery]   


 


Blood Pressure   





[Right Radial   





artery]   


 


O2 Saturation 100 100 99














  06/14/17 06/14/17 06/14/17





  17:40 17:50 18:30


 


Temperature   98.4 F


 


Heart Rate [   54 L





Brachial]   


 


Respiratory   16





Rate   


 


Blood Pressure   130/76





[Right Brachial   





artery]   


 


Blood Pressure   





[Right Radial   





artery]   


 


O2 Saturation 99 99 98














  06/14/17 06/14/17 06/14/17





  19:30 20:30 23:55


 


Temperature 98.4 F 98.8 F 98.8 F


 


Heart Rate [ 61 71 81





Brachial]   


 


Respiratory 16 18 20





Rate   


 


Blood Pressure 124/73 149/82 H 





[Right Brachial   





artery]   


 


Blood Pressure   119/78





[Right Radial   





artery]   


 


O2 Saturation 97 98 97














  06/15/17 06/15/17 06/15/17





  05:04 08:38 13:12


 


Temperature 98.6 F 98.4 F 99.1 F


 


Heart Rate [ 83 66 67





Brachial]   


 


Respiratory 20 18 18





Rate   


 


Blood Pressure   107/57 L





[Right Brachial   





artery]   


 


Blood Pressure 110/65 134/66 H 





[Right Radial   





artery]   


 


O2 Saturation 95 96 95














  06/15/17 06/15/17 06/16/17





  16:33 21:43 00:12


 


Temperature 97.5 F L 98.6 F 97.5 F L


 


Heart Rate [ 62 70 62





Brachial]   


 


Respiratory 20 18 16





Rate   


 


Blood Pressure   





[Right Brachial   





artery]   


 


Blood Pressure 108/57 L 130/71 106/65





[Right Radial   





artery]   


 


O2 Saturation 98 97 97














  06/16/17 06/16/17 06/16/17





  04:10 07:36 12:42


 


Temperature 98.4 F 98.2 F 97.7 F


 


Heart Rate [ 65 67 63





Brachial]   


 


Respiratory 18 20 16





Rate   


 


Blood Pressure   





[Right Brachial   





artery]   


 


Blood Pressure 115/72 138/74 H 131/75 H





[Right Radial   





artery]   


 


O2 Saturation 96 95 98














  06/17/17 06/17/17





  05:01 08:39


 


Temperature 97.7 F 97.9 F


 


Heart Rate [ 61 60





Brachial]  


 


Respiratory 18 16





Rate  


 


Blood Pressure  





[Right Brachial  





artery]  


 


Blood Pressure 117/70 130/73





[Right Radial  





artery]  


 


O2 Saturation 99 97











General: Alert, Oriented x3, No acute distress


HEENT: EOMI, Mucous membr. moist/pink


Lungs: Clear to auscultation


Cardiovascular: Regular rate, Normal S1, Normal S2


Abdomen: Normal bowel sounds, No tenderness, Other (Wounds healing Well;  No 

Discharge or Blood on Pad (reported by nursing))


Extremities: No edema


Skin: No rashes


Neurological: Normal speech, Sensation intact


Psych/Mental Status: Mental status NL, Mood NL





Assess/Plan





- Patient Problems


Active Problems List: 


Current Active Problems





Hemorrhage in uterus (Acute) 











- Additional Planning


Condition/Complexity: Stable (Patient capable of self care & discharge; She 

desires the same)


Plan Discussed with:: Patient (Discussed maintaining reasonable activity, 

nutritional intake and help at home.  Discussed post discharge analgesia, 

medication side effects and to use narcotics prudently. Reviewed warning signs 

and call back instructions:  fever, foul discharge, sob, and signs of infection.

)


Time Spent: 15-30 minutes

## 2017-07-07 ENCOUNTER — HOSPITAL ENCOUNTER (OUTPATIENT)
Dept: HOSPITAL 76 - LAB | Age: 57
Discharge: HOME | End: 2017-07-07
Attending: OBSTETRICS & GYNECOLOGY
Payer: MEDICAID

## 2017-07-07 DIAGNOSIS — E00.9: Primary | ICD-10-CM

## 2017-07-07 LAB
ERYTHROCYTE [DISTWIDTH] IN BLOOD BY AUTOMATED COUNT: 14.1 % (ref 12–15)
HCT VFR BLD AUTO: 32.9 % (ref 37–47)
HGB UR QL STRIP: 10.9 G/DL (ref 12–16)
MCH RBC QN AUTO: 28.8 PG (ref 27–31)
MCHC RBC AUTO-ENTMCNC: 33.2 G/DL (ref 32–36)
MCV RBC AUTO: 86.9 FL (ref 81–99)
NEUTROPHILS # SNV AUTO: 4.2 X10^3/UL (ref 4.8–10.8)
PDW BLD AUTO: 8.8 FL (ref 7.9–10.8)
RBC MAR: 3.78 10^6/UL (ref 4.2–5.4)
TSH SERPL-ACNC: 23.49 UIU/ML (ref 0.34–5.6)
VIT B12 SERPL-MCNC: 221 PG/ML (ref 180–914)

## 2017-07-07 PROCEDURE — 36415 COLL VENOUS BLD VENIPUNCTURE: CPT

## 2017-07-07 PROCEDURE — 84481 FREE ASSAY (FT-3): CPT

## 2017-07-07 PROCEDURE — 82607 VITAMIN B-12: CPT

## 2017-07-07 PROCEDURE — 84443 ASSAY THYROID STIM HORMONE: CPT

## 2017-07-07 PROCEDURE — 84439 ASSAY OF FREE THYROXINE: CPT

## 2017-08-09 ENCOUNTER — HOSPITAL ENCOUNTER (OUTPATIENT)
Dept: HOSPITAL 76 - LAB | Age: 57
Discharge: HOME | End: 2017-08-09
Attending: OBSTETRICS & GYNECOLOGY
Payer: MEDICAID

## 2017-08-09 DIAGNOSIS — D25.9: ICD-10-CM

## 2017-08-09 DIAGNOSIS — E00.9: Primary | ICD-10-CM

## 2017-08-09 LAB
ERYTHROCYTE [DISTWIDTH] IN BLOOD BY AUTOMATED COUNT: 14.4 % (ref 12–15)
HCT VFR BLD AUTO: 35.5 % (ref 37–47)
HGB UR QL STRIP: 11.9 G/DL (ref 12–16)
MCH RBC QN AUTO: 29.2 PG (ref 27–31)
MCHC RBC AUTO-ENTMCNC: 33.4 G/DL (ref 32–36)
MCV RBC AUTO: 87.5 FL (ref 81–99)
NEUTROPHILS # SNV AUTO: 4.1 X10^3/UL (ref 4.8–10.8)
PDW BLD AUTO: 9 FL (ref 7.9–10.8)
RBC MAR: 4.06 10^6/UL (ref 4.2–5.4)
TSH SERPL-ACNC: 9.32 UIU/ML (ref 0.34–5.6)

## 2017-08-09 PROCEDURE — 84481 FREE ASSAY (FT-3): CPT

## 2017-08-09 PROCEDURE — 84443 ASSAY THYROID STIM HORMONE: CPT

## 2017-08-09 PROCEDURE — 36415 COLL VENOUS BLD VENIPUNCTURE: CPT

## 2017-08-09 PROCEDURE — 84439 ASSAY OF FREE THYROXINE: CPT

## 2017-09-26 ENCOUNTER — HOSPITAL ENCOUNTER (OUTPATIENT)
Dept: HOSPITAL 76 - LAB | Age: 57
Discharge: HOME | End: 2017-09-26
Attending: OBSTETRICS & GYNECOLOGY
Payer: MEDICAID

## 2017-09-26 DIAGNOSIS — E00.9: Primary | ICD-10-CM

## 2017-09-26 LAB — TSH SERPL-ACNC: 10.56 UIU/ML (ref 0.34–5.6)

## 2017-09-26 PROCEDURE — 84439 ASSAY OF FREE THYROXINE: CPT

## 2017-09-26 PROCEDURE — 84481 FREE ASSAY (FT-3): CPT

## 2017-09-26 PROCEDURE — 84443 ASSAY THYROID STIM HORMONE: CPT

## 2017-09-26 PROCEDURE — 36415 COLL VENOUS BLD VENIPUNCTURE: CPT

## 2018-01-29 ENCOUNTER — HOSPITAL ENCOUNTER (OUTPATIENT)
Dept: HOSPITAL 76 - LAB.N | Age: 58
Discharge: HOME | End: 2018-01-29
Attending: OBSTETRICS & GYNECOLOGY
Payer: MEDICAID

## 2018-01-29 DIAGNOSIS — E03.9: Primary | ICD-10-CM

## 2018-01-29 LAB
EST. AVERAGE GLUCOSE BLD GHB EST-MCNC: 111 MG/DL (ref 70–100)
HB2 TOTAL: 13.5 G/DL
HBA1C BLD-MCNC: 0.49 G/DL
HEMOGLOBIN A1C %: 5.5 % (ref 4.6–6.2)
T4 FREE SERPL-MCNC: 1.16 NG/DL (ref 0.58–1.64)
TSH SERPL-ACNC: 2.73 UIU/ML (ref 0.34–5.6)

## 2018-01-29 PROCEDURE — 36415 COLL VENOUS BLD VENIPUNCTURE: CPT

## 2018-01-29 PROCEDURE — 84443 ASSAY THYROID STIM HORMONE: CPT

## 2018-01-29 PROCEDURE — 83036 HEMOGLOBIN GLYCOSYLATED A1C: CPT

## 2018-01-29 PROCEDURE — 84439 ASSAY OF FREE THYROXINE: CPT

## 2018-01-29 PROCEDURE — 82947 ASSAY GLUCOSE BLOOD QUANT: CPT

## 2018-01-29 PROCEDURE — 84481 FREE ASSAY (FT-3): CPT

## 2018-02-05 ENCOUNTER — HOSPITAL ENCOUNTER (OUTPATIENT)
Dept: HOSPITAL 76 - DI | Age: 58
Discharge: HOME | End: 2018-02-05
Attending: OBSTETRICS & GYNECOLOGY
Payer: MEDICARE

## 2018-02-05 DIAGNOSIS — Z12.31: Primary | ICD-10-CM

## 2018-02-05 PROCEDURE — 77067 SCR MAMMO BI INCL CAD: CPT

## 2018-02-06 NOTE — MAMMOGRAPHY REPORT
DATE OF SERVICE: 02/05/2018

 

DIGITAL SCREENING MAMMOGRAM:  02/05/2018

 

CLINICAL INDICATION:  A 57-year-old for screening.

 

COMPARISON:  09/2016, 11/2008.

 

TECHNIQUE:  Routine CC and MLO projections were obtained of the breasts.

 

FINDINGS:  Parenchymal tissue within the breasts is predominantly fatty replaced.  There are no

dominant masses, suspicious microcalcifications, or secondary signs of malignancy.  In 

comparison to the previous studies, there are no significant changes.

 

IMPRESSION:  NO MAMMOGRAPHIC EVIDENCE OF MALIGNANCY.  NO SIGNIFICANT INTERVAL CHANGES.

 

RECOMMENDATION:  Screening mammography is recommended annually.

 

BIRADS category 1 - negative.

 

STANDARD QUALIFYING STATEMENTS:

1.  This examination was reviewed with the aid of Computed-Aided Detection (CAD).

2.  A negative or benign imaging report should not delay biopsy if clinically suspicious 

findings are present.  Consider surgical consultation if warranted.  More than 5% of cancers 

are not identified by imaging.

3.  Dense breasts may obscure an underlying neoplasm.

 

 

DD: 02/06/2018 12:41

TD: 02/06/2018 17:22

Job #: [id number]